# Patient Record
Sex: FEMALE | Race: WHITE | NOT HISPANIC OR LATINO | Employment: OTHER | URBAN - METROPOLITAN AREA
[De-identification: names, ages, dates, MRNs, and addresses within clinical notes are randomized per-mention and may not be internally consistent; named-entity substitution may affect disease eponyms.]

---

## 2017-06-21 ENCOUNTER — ALLSCRIPTS OFFICE VISIT (OUTPATIENT)
Dept: OTHER | Facility: OTHER | Age: 72
End: 2017-06-21

## 2017-06-21 DIAGNOSIS — R53.83 OTHER FATIGUE: ICD-10-CM

## 2017-06-21 DIAGNOSIS — Z00.00 ENCOUNTER FOR GENERAL ADULT MEDICAL EXAMINATION WITHOUT ABNORMAL FINDINGS: ICD-10-CM

## 2017-06-21 DIAGNOSIS — F90.9 ATTENTION-DEFICIT HYPERACTIVITY DISORDER: ICD-10-CM

## 2017-06-21 DIAGNOSIS — R63.5 ABNORMAL WEIGHT GAIN: ICD-10-CM

## 2017-06-21 DIAGNOSIS — E55.9 VITAMIN D DEFICIENCY: ICD-10-CM

## 2017-06-21 DIAGNOSIS — E78.5 HYPERLIPIDEMIA: ICD-10-CM

## 2017-06-21 DIAGNOSIS — R06.00 DYSPNEA: ICD-10-CM

## 2017-06-22 ENCOUNTER — GENERIC CONVERSION - ENCOUNTER (OUTPATIENT)
Dept: OTHER | Facility: OTHER | Age: 72
End: 2017-06-22

## 2017-06-22 ENCOUNTER — APPOINTMENT (OUTPATIENT)
Dept: LAB | Age: 72
End: 2017-06-22
Payer: MEDICARE

## 2017-06-22 ENCOUNTER — TRANSCRIBE ORDERS (OUTPATIENT)
Dept: ADMINISTRATIVE | Age: 72
End: 2017-06-22

## 2017-06-22 DIAGNOSIS — R06.00 DYSPNEA: ICD-10-CM

## 2017-06-22 DIAGNOSIS — E78.5 HYPERLIPIDEMIA: ICD-10-CM

## 2017-06-22 DIAGNOSIS — R63.5 ABNORMAL WEIGHT GAIN: ICD-10-CM

## 2017-06-22 DIAGNOSIS — Z00.00 ENCOUNTER FOR GENERAL ADULT MEDICAL EXAMINATION WITHOUT ABNORMAL FINDINGS: ICD-10-CM

## 2017-06-22 DIAGNOSIS — F90.9 ATTENTION-DEFICIT HYPERACTIVITY DISORDER: ICD-10-CM

## 2017-06-22 DIAGNOSIS — E55.9 VITAMIN D DEFICIENCY: ICD-10-CM

## 2017-06-22 DIAGNOSIS — R53.83 OTHER FATIGUE: ICD-10-CM

## 2017-06-22 LAB
25(OH)D3 SERPL-MCNC: 25.5 NG/ML (ref 30–100)
ALBUMIN SERPL BCP-MCNC: 3.3 G/DL (ref 3.5–5)
ALP SERPL-CCNC: 94 U/L (ref 46–116)
ALT SERPL W P-5'-P-CCNC: 29 U/L (ref 12–78)
ANION GAP SERPL CALCULATED.3IONS-SCNC: 7 MMOL/L (ref 4–13)
AST SERPL W P-5'-P-CCNC: 22 U/L (ref 5–45)
BASOPHILS # BLD AUTO: 0.03 THOUSANDS/ΜL (ref 0–0.1)
BASOPHILS NFR BLD AUTO: 1 % (ref 0–1)
BILIRUB SERPL-MCNC: 0.29 MG/DL (ref 0.2–1)
BUN SERPL-MCNC: 13 MG/DL (ref 5–25)
CALCIUM SERPL-MCNC: 8.7 MG/DL (ref 8.3–10.1)
CHLORIDE SERPL-SCNC: 107 MMOL/L (ref 100–108)
CHOLEST SERPL-MCNC: 167 MG/DL (ref 50–200)
CO2 SERPL-SCNC: 30 MMOL/L (ref 21–32)
CREAT SERPL-MCNC: 0.67 MG/DL (ref 0.6–1.3)
EOSINOPHIL # BLD AUTO: 0.21 THOUSAND/ΜL (ref 0–0.61)
EOSINOPHIL NFR BLD AUTO: 4 % (ref 0–6)
ERYTHROCYTE [DISTWIDTH] IN BLOOD BY AUTOMATED COUNT: 12.6 % (ref 11.6–15.1)
GFR SERPL CREATININE-BSD FRML MDRD: >60 ML/MIN/1.73SQ M
GLUCOSE P FAST SERPL-MCNC: 81 MG/DL (ref 65–99)
HCT VFR BLD AUTO: 40.4 % (ref 34.8–46.1)
HDLC SERPL-MCNC: 46 MG/DL (ref 40–60)
HGB BLD-MCNC: 13.3 G/DL (ref 11.5–15.4)
LDLC SERPL CALC-MCNC: 106 MG/DL (ref 0–100)
LYMPHOCYTES # BLD AUTO: 1.92 THOUSANDS/ΜL (ref 0.6–4.47)
LYMPHOCYTES NFR BLD AUTO: 36 % (ref 14–44)
MCH RBC QN AUTO: 31.2 PG (ref 26.8–34.3)
MCHC RBC AUTO-ENTMCNC: 32.9 G/DL (ref 31.4–37.4)
MCV RBC AUTO: 95 FL (ref 82–98)
MONOCYTES # BLD AUTO: 0.54 THOUSAND/ΜL (ref 0.17–1.22)
MONOCYTES NFR BLD AUTO: 10 % (ref 4–12)
NEUTROPHILS # BLD AUTO: 2.63 THOUSANDS/ΜL (ref 1.85–7.62)
NEUTS SEG NFR BLD AUTO: 49 % (ref 43–75)
NRBC BLD AUTO-RTO: 0 /100 WBCS
PLATELET # BLD AUTO: 215 THOUSANDS/UL (ref 149–390)
PMV BLD AUTO: 11.6 FL (ref 8.9–12.7)
POTASSIUM SERPL-SCNC: 3.8 MMOL/L (ref 3.5–5.3)
PROT SERPL-MCNC: 6.7 G/DL (ref 6.4–8.2)
RBC # BLD AUTO: 4.26 MILLION/UL (ref 3.81–5.12)
SODIUM SERPL-SCNC: 144 MMOL/L (ref 136–145)
TRIGL SERPL-MCNC: 77 MG/DL
TSH SERPL DL<=0.05 MIU/L-ACNC: 2.66 UIU/ML (ref 0.36–3.74)
WBC # BLD AUTO: 5.34 THOUSAND/UL (ref 4.31–10.16)

## 2017-06-22 PROCEDURE — 82306 VITAMIN D 25 HYDROXY: CPT

## 2017-06-22 PROCEDURE — 36415 COLL VENOUS BLD VENIPUNCTURE: CPT

## 2017-06-22 PROCEDURE — 84443 ASSAY THYROID STIM HORMONE: CPT

## 2017-06-22 PROCEDURE — 80053 COMPREHEN METABOLIC PANEL: CPT

## 2017-06-22 PROCEDURE — 80061 LIPID PANEL: CPT

## 2017-06-22 PROCEDURE — 85025 COMPLETE CBC W/AUTO DIFF WBC: CPT

## 2017-08-24 ENCOUNTER — TRANSCRIBE ORDERS (OUTPATIENT)
Dept: ADMINISTRATIVE | Facility: HOSPITAL | Age: 72
End: 2017-08-24

## 2017-08-24 DIAGNOSIS — Z12.31 ENCOUNTER FOR SCREENING MAMMOGRAM FOR MALIGNANT NEOPLASM OF BREAST: Primary | ICD-10-CM

## 2018-01-10 NOTE — PROGRESS NOTES
Assessment   1  Never a smoker  2  Encounter for preventive health examination (V70 0) (Z00 00)  3  Attention deficit hyperactivity disorder (314 01) (F90 9)  4  Hyperlipidemia (272 4) (E78 5)  5  Vitamin D deficiency (268 9) (E55 9)  6  Fatigue (780 79) (R53 83)  7  Weight gain (783 1) (R63 5)  8  Dyspnea (786 09) (R06 00)    Plan   Attention deficit hyperactivity disorder, Dyspnea, Health Maintenance, Fatigue,  Hyperlipidemia, Vitamin D deficiency, Weight gain    · (1) CBC/PLT/DIFF; Status:Active - Retrospective Authorization; Requested  [id number];    · (1) COMPREHENSIVE METABOLIC PANEL; Status:Active - Retrospective Authorization; Requested [id number];    · (1) LIPID PANEL FASTING W DIRECT LDL REFLEX; Status:Active - Retrospective  Authorization; Requested [id number];    · (1) TSH WITH FT4 REFLEX; Status:Active - Retrospective Authorization; Requested  [id number];    · (1) VITAMIN D 25-HYDROXY; Status:Active - Retrospective Authorization; Requested  [id number];    · EKG/ECG- POC; Status:Active - Perform Order,Retrospective Authorization; Requested  [id number]; Health Maintenance    · Follow-up visit in 3 months Evaluation and Treatment  Follow-up  Status: Hold For -  Scheduling  Requested for: 21Jun2017    Follow-up visit in 3 months Evaluation and Treatment  Follow-up  Status: Hold For - Scheduling,Retrospective Authorization  Requested for: 21Jun2017  Ordered; For: Attention deficit hyperactivity disorder, Dyspnea, Health Maintenance, Fatigue, Hyperlipidemia, Vitamin D deficiency, Weight gain;  Ordered ByPaula Schlatter  Performed:   Due: 07LFN3793; Last Updated By: Rl Corona; 6/21/2017 10:00:16 AM     Discussion/Summary    Melissa Byrd appears healthy on exam   She is to continue f/u with Psychiatry - mood appears stable on present management  We discussed using OTC moisturizers for her skin    ECG today reassuring (fatigue, weight gain); we will order FBW, with CBC, Vit D, and TSH included  She is to focus on a lower carb diet, and continued regular exercise  Pt is also to f/u with GYN as planned  Pt was given a script for Zostavax to try to get at her pharmacy  Impression: Subsequent Annual Wellness Visit  Patient Discussion: plan discussed with the patient, follow-up visit needed in 3 months, or sooner PRN  Chief Complaint  Patient presents to office for a Medicare wellness exam  She is new to the clinic  History of Present Illness  HPI: New pt today  Pt stays active, runs  Is going to the gym regularly  Tired a lot  Sleeps well  Has had no recent falls  Exercise tolerance has diminished in the last year  Has noticed an 8 lb gain over the last 2 - 3 months  Does note that she likes carbs in her diet  No CP; no real SOB, but her exercise tolerance is down  No rectal bleeding  Mood is stable on present management - does not feel depressed  She is still followed by Psychiatry  Is UTD on colonoscopy - 2 years ago; Is rescheduling mammogram; PAP smears UTD with GYN  Sees her Dentist regularly  Has had Pneumovax, but needs Zostavax  Stopped drinking 11 years ago  Former smoker - quit in [address]     Welcome to [employer] and Wellness Visits: The patient is being seen for the subsequent annual wellness visit  Drug and Alcohol Use: The patient is a former cigarette smoker and quit smoking 1980  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 and Has not drank in over 11 years  Diet and Physical Activity: Current diet includes well balanced meals and Does like carbs in her diet  Exercise: strength training, Running  7 hours per week  Mood Disorder and Cognitive Impairment Screening: She denies feeling down, depressed, or hopeless over the past two weeks  She denies feeling little interest or pleasure in doing things over the past two weeks  Functional Ability/Level of Safety: Hearing is normal bilaterally   The patient is currently able to do activities of daily living without limitations and able to do instrumental activities of daily living without limitations  Activities of daily living details: does not need help using the phone, no transportation help needed, does not need help shopping, no meal preparation help needed, does not need help doing housework, does not need help doing laundry, does not need help managing medications and does not need help managing money  Fall risk factors: The patient fell 0 times in the past 12 months  Co-Managers and Medical Equipment/Suppliers: See Patient Care Team      Patient Care Team    Care Team Member Role Specialty Office Number   [address] [facility], [facility]   [facility]  Orthopedic Surgery [phone]     Review of Systems    Constitutional: as noted in HPI  Cardiovascular: as noted in HPI  Respiratory: as noted in HPI  Gastrointestinal: as noted in HPI  Psychiatric: as noted in HPI  Endocrine: as noted in HPI  Active Problems   1  Acute sinusitis (461 9) (J01 90)  2  Ankle Sprain (845 00)  3  Attention deficit hyperactivity disorder (314 01) (F90 9)  4  Closed Fracture Of The Head Of The Right Talus (825 21)  5  Cough (786 2) (R05)  6  Elbow pain, unspecified laterality (719 42) (M25 529)  7  Encounter for screening for malignant neoplasm of colon (V76 51) (Z12 11)  8  Hyperlipidemia (272 4) (E78 5)  9  Lower back pain (724 2) (M54 5)  10  Neck pain (723 1) (M54 2)  11  PND (post-nasal drip) (784 91) (R09 82)  12  Rhinitis (472 0) (J31 0)  13  Tendon Rupture Of The Right Ankle Tibialis Posterior (727 68)  14  Trigger thumb of left hand (727 03) (M65 312)  15  Vitamin D deficiency (268 9) (E55 9)    Past Medical History    · History of Cough (786 2) (R05)   · History of cataract (V12 49) (Z86 69)    The active problems and past medical history were reviewed and updated today  Surgical History    · History of Appendectomy    The surgical history was reviewed and updated today         Family History    The family history was reviewed and updated today  Social History    · Never a smoker   · No alcohol use  The social history was reviewed and updated today  Current Meds  1  B-12 1000 MCG Oral Capsule; take 1 capsule daily; Therapy: (Elray Flicker) to Recorded  2  Concerta 36 MG Oral Tablet Extended Release; TAKE 1 TABLET DAILY; Therapy: 03Una8395 to  Requested for: 21Jun2017 Recorded  3  Concerta 54 MG Oral Tablet Extended Release; TAKE 1 TABLET EVERY MORNING; Therapy: (Elray Flicker) to Recorded  4  Fluticasone Propionate 50 MCG/ACT Nasal Suspension; use 2 sprays in each nostril   once daily; Therapy: 73WPS8098 to (01 72 64 30 83)  Requested for: 62Whp4951; Last   Rx:12Bpy8190 Ordered  5  Lexapro 10 MG Oral Tablet; TAKE 1 TABLET DAILY; Therapy: (Elray Flicker) to Recorded  6  Vitamin D3 1000 UNIT Oral Capsule; Take 1 capsule twice daily; Therapy: 72Iel2942 to (Last Rx:59Zkf1796)  Requested for: 14Deu4379 Ordered    The medication list was reviewed and updated today  Allergies   1  No Known Drug Allergies   2  No Known Food Allergies    Vitals  Signs    Heart Rate: 76  Respiration: 16  Systolic: 120  Diastolic: 64  Height: 5 ft 4 in  Weight: 144 lb 3 2 oz  BMI Calculated: 24 75  BSA Calculated: 1 7    Physical Exam    Constitutional   General appearance: No acute distress, well appearing and well nourished  NAD; VSS; pleasant  Head and Face   Head and face: Normal     Eyes   Conjunctiva and lids: No swelling, erythema or discharge  Ears, Nose, Mouth, and Throat   External inspection of ears and nose: Normal     Otoscopic examination: Tympanic membranes translucent with normal light reflex  Canals patent without erythema  Some cerumen bilaterally  Hearing: Normal     Lips, teeth, and gums: Normal, good dentition  Oropharynx: Normal with no erythema, edema, exudate or lesions  Neck   Neck: Supple, symmetric, trachea midline, no masses    Some dry patches of skin around the lower portion of her nek; no erythema or excoriations  Pulmonary   Respiratory effort: No increased work of breathing or signs of respiratory distress  Auscultation of lungs: Clear to auscultation  Cardiovascular   Auscultation of heart: Normal rate and rhythm, normal S1 and S2, no murmurs  Abdomen   Abdomen: Non-tender, no masses  Liver and spleen: No hepatomegaly or splenomegaly  Musculoskeletal   Gait and station: Normal     Psychiatric   Judgment and insight: Normal     Orientation to person, place, and time: Normal     Recent and remote memory: Intact  Mood and affect: Normal        Results/Data  A 12 lead ECG was performed and was normal  No acute ischemia  Rhythm and rate: ventricular rate is 70 beats per minute, but normal sinus rhythm  P-waves: MI interval is normal, but the P wave is normal    Axis: the QRS axis is normal    QRS: the QRS is normal    ST segment: the ST segments are normal    T waves: the T waves are inverted in lead(s) V3  Comparison to prior ECGs:  no prior ECGs were available for comparison        Future Appointments    Date/Time Provider Specialty Site   09/21/2017 08:45 AM Ada Sanchez DO Family Medicine [facility]     Signatures   Electronically signed by : Hollie Quintana DO; Jun 21 2017 12:51PM EST                       (Author)

## 2018-01-14 VITALS
RESPIRATION RATE: 16 BRPM | SYSTOLIC BLOOD PRESSURE: 100 MMHG | DIASTOLIC BLOOD PRESSURE: 64 MMHG | BODY MASS INDEX: 24.62 KG/M2 | HEART RATE: 76 BPM | WEIGHT: 144.2 LBS | HEIGHT: 64 IN

## 2018-01-15 NOTE — RESULT NOTES
Verified Results  (1) COMPREHENSIVE METABOLIC PANEL [id number] 02:06BI Chip Beck Order Number: [id number]     Test Name Result Flag Reference   SODIUM 144 mmol/L  136-145   POTASSIUM 3 8 mmol/L  3 5-5 3   CHLORIDE 107 mmol/L  100-108   CARBON DIOXIDE 30 mmol/L  21-32   ANION GAP (CALC) 7 mmol/L  4-13   BLOOD UREA NITROGEN 13 mg/dL  5-25   CREATININE 0 67 mg/dL  0 60-1 30   Standardized to IDMS reference method   CALCIUM 8 7 mg/dL  8 3-10 1   BILI, TOTAL 0 29 mg/dL  0 20-1 00   ALK PHOSPHATAS 94 U/L     ALT (SGPT) 29 U/L  12-78   AST(SGOT) 22 U/L  5-45   ALBUMIN 3 3 g/dL L 3 5-5 0   TOTAL PROTEIN 6 7 g/dL  6 4-8 2   eGFR Non-African American      >60 0 ml/min/1 73sq m   [facility] Disease Education Program recommendations are as follows:  GFR calculation is accurate only with a steady state creatinine  Chronic Kidney disease less than 60 ml/min/1 73 sq  meters  Kidney failure less than 15 ml/min/1 73 sq  meters  GLUCOSE FASTING 81 mg/dL  65-99     (1) CBC/PLT/DIFF 22Jun2017 07:46AM Chip Beck Order Number: [id number]     Test Name Result Flag Reference   WBC COUNT 5 34 Thousand/uL  4 31-10 16   RBC COUNT 4 26 Million/uL  3 81-5 12   HEMOGLOBIN 13 3 g/dL  11 5-15 4   HEMATOCRIT 40 4 %  34 8-46  1   MCV 95 fL  82-98   MCH 31 2 pg  26 8-34 3   MCHC 32 9 g/dL  31 4-37 4   RDW 12 6 %  11 6-15 1   MPV 11 6 fL  8 9-12 7   PLATELET COUNT 513 Thousands/uL  149-390   nRBC AUTOMATED 0 /100 WBCs     NEUTROPHILS RELATIVE PERCENT 49 %  43-75   LYMPHOCYTES RELATIVE PERCENT 36 %  14-44   MONOCYTES RELATIVE PERCENT 10 %  4-12   EOSINOPHILS RELATIVE PERCENT 4 %  0-6   BASOPHILS RELATIVE PERCENT 1 %  0-1   NEUTROPHILS ABSOLUTE COUNT 2 63 Thousands/? ??L  1 85-7 62   LYMPHOCYTES ABSOLUTE COUNT 1 92 Thousands/? ??L  0 60-4 47   MONOCYTES ABSOLUTE COUNT 0 54 Thousand/? ??L  0 17-1 22   EOSINOPHILS ABSOLUTE COUNT 0 21 Thousand/? ??L  0 00-0 61   BASOPHILS ABSOLUTE COUNT 0 03 Thousands/? ??L  0 00-0 10     (1) LIPID PANEL FASTING W DIRECT LDL REFLEX 22Jun2017 07:46AM Verl Core Order Number: [id number]     Test Name Result Flag Reference   CHOLESTEROL 167 mg/dL     LDL CHOLESTEROL CALCULATED 106 mg/dL H 0-100   Triglyceride:         Normal              <150 mg/dl       Borderline High    150-199 mg/dl       High               200-499 mg/dl       Very High          >499 mg/dl  Cholesterol:         Desirable        <200 mg/dl      Borderline High  200-239 mg/dl      High             >239 mg/dl  HDL Cholesterol:        High    >59 mg/dL      Low     <41 mg/dL  LDL Cholesterol:        Optimal          <100 mg/dl        Near Optimal     100-129 mg/dl        Above Optimal          Borderline High   130-159 mg/dl          High              160-189 mg/dl          Very High        >189 mg/dl  LDL CALCULATED:    This screening LDL is a calculated result  It does not have the accuracy of the Direct Measured LDL in the monitoring of patients with hyperlipidemia and/or statin therapy  Direct Measure LDL (XYH088) must be ordered separately in these patients  TRIGLYCERIDES 77 mg/dL  <=150   Specimen collection should occur prior to N-Acetylcysteine or Metamizole administration due to the potential for falsely depressed results  HDL,DIRECT 46 mg/dL  40-60   Specimen collection should occur prior to Metamizole administration due to the potential for falsely depressed results  (1) TSH WITH FT4 REFLEX 22Jun2017 07:46AM Verl Core Order Number: [id number]     Test Name Result Flag Reference   TSH 2 660 uIU/mL  0 358-3 740   Patients undergoing fluorescein dye angiography may retain small amounts of fluorescein in the body for 48-72 hours post procedure  Samples containing fluorescein can produce falsely depressed TSH values  If the patient had this procedure,a specimen should be resubmitted post fluorescein clearance            The recommended reference ranges for TSH during pregnancy are as follows:  First trimester 0 1 to 2 5 uIU/mL  Second trimester  0 2 to 3 0 uIU/mL  Third trimester 0 3 to 3 0 uIU/m     (1) VITAMIN D 25-HYDROXY 14Syg1086 07:46AM Mable Donohue Order Number: [id number]     Test Name Result Flag Reference   VIT D 25-HYDROX 25 5 ng/mL L 30 0-100 0   This assay is a certified procedure of the CDC Vitamin D Standardization Certification Program (VDSCP)     Deficiency <20ng/ml   Insufficiency 20-30ng/ml   Sufficient  ng/ml     *Patients undergoing fluorescein dye angiography may retain small amounts of fluorescein in the body for 48-72 hours post procedure  Samples containing fluorescein can produce falsely elevated Vitamin D values  If the patient had this procedure, a specimen should be resubmitted post fluorescein clearance

## 2018-02-28 ENCOUNTER — APPOINTMENT (RX ONLY)
Dept: URBAN - METROPOLITAN AREA CLINIC 116 | Facility: CLINIC | Age: 73
Setting detail: DERMATOLOGY
End: 2018-02-28

## 2018-02-28 DIAGNOSIS — L29.89 OTHER PRURITUS: ICD-10-CM

## 2018-02-28 DIAGNOSIS — L30.4 ERYTHEMA INTERTRIGO: ICD-10-CM

## 2018-02-28 DIAGNOSIS — R20.2 PARESTHESIA OF SKIN: ICD-10-CM

## 2018-02-28 PROBLEM — L85.3 XEROSIS CUTIS: Status: ACTIVE | Noted: 2018-02-28

## 2018-02-28 PROBLEM — L29.8 OTHER PRURITUS: Status: ACTIVE | Noted: 2018-02-28

## 2018-02-28 PROCEDURE — ? TREATMENT REGIMEN

## 2018-02-28 PROCEDURE — ? PRESCRIPTION

## 2018-02-28 PROCEDURE — ? DIAGNOSIS COMMENT

## 2018-02-28 PROCEDURE — 99203 OFFICE O/P NEW LOW 30 MIN: CPT

## 2018-02-28 PROCEDURE — ? COUNSELING

## 2018-02-28 RX ORDER — IODOQUINOL, HYDROCORTISONE ACETATE AND ALOE VERA LEAF 10; 20; 10 MG/G; MG/G; MG/G
GEL TOPICAL
Qty: 1 | Refills: 3 | Status: ERX | COMMUNITY
Start: 2018-02-28

## 2018-02-28 RX ADMIN — IODOQUINOL, HYDROCORTISONE ACETATE AND ALOE VERA LEAF: 10; 20; 10 GEL TOPICAL at 19:04

## 2018-02-28 ASSESSMENT — LOCATION DETAILED DESCRIPTION DERM
LOCATION DETAILED: SUPERIOR THORACIC SPINE
LOCATION DETAILED: LOWER STERNUM
LOCATION DETAILED: LEFT INGUINAL CREASE
LOCATION DETAILED: RIGHT INGUINAL CREASE

## 2018-02-28 ASSESSMENT — LOCATION ZONE DERM: LOCATION ZONE: TRUNK

## 2018-02-28 ASSESSMENT — LOCATION SIMPLE DESCRIPTION DERM
LOCATION SIMPLE: CHEST
LOCATION SIMPLE: GROIN
LOCATION SIMPLE: UPPER BACK

## 2018-02-28 NOTE — PROCEDURE: TREATMENT REGIMEN
Initiate Treatment: Zeasorb AF powder daily\\nAlcortin A BID until clear
Detail Level: Simple
Initiate Treatment: Zeasorb AF powder daily when sweating/exercising\\nAlcortin A BID PRN rash

## 2018-02-28 NOTE — PROCEDURE: MIPS QUALITY
Quality 226: Preventive Care And Screening: Tobacco Use: Screening And Cessation Intervention: Patient screened for tobacco and never smoked
Detail Level: Detailed
Quality 111:Pneumonia Vaccination Status For Older Adults: Pneumococcal Vaccination not Administered or Previously Received, Reason not Otherwise Specified
Quality 110: Preventive Care And Screening: Influenza Immunization: Influenza Immunization not Administered because Patient Refused.

## 2019-08-02 DIAGNOSIS — Z12.39 ENCOUNTER FOR SCREENING FOR MALIGNANT NEOPLASM OF BREAST: Primary | ICD-10-CM
